# Patient Record
Sex: FEMALE | Race: WHITE | Employment: FULL TIME | URBAN - METROPOLITAN AREA
[De-identification: names, ages, dates, MRNs, and addresses within clinical notes are randomized per-mention and may not be internally consistent; named-entity substitution may affect disease eponyms.]

---

## 2017-12-12 ENCOUNTER — TELEPHONE (OUTPATIENT)
Dept: FAMILY MEDICINE CLINIC | Facility: CLINIC | Age: 22
End: 2017-12-12

## 2017-12-12 NOTE — TELEPHONE ENCOUNTER
Patient states that she has had multiple episodes of lightheadness. Currently feels slightly lightheaded.  Transferred to Triage

## 2017-12-12 NOTE — TELEPHONE ENCOUNTER
S/w pt. No she cannot come tomorrow d/t finals. She refused apt earlier when Omar offered to her. Pt will keep apt Thursday with jade.  Pt aware to immed care if sx's worsen prior to vs.

## 2017-12-12 NOTE — TELEPHONE ENCOUNTER
Had sudden light headedness last week while sitting down. Then had lightheadedness and vertigo, world is tilting not spinning, slight nausea. Feels like she is walking on water. When pt stretches she gets tingling in her feet, but can still feel them.

## 2017-12-14 ENCOUNTER — TELEPHONE (OUTPATIENT)
Dept: FAMILY MEDICINE CLINIC | Facility: CLINIC | Age: 22
End: 2017-12-14

## 2017-12-14 ENCOUNTER — OFFICE VISIT (OUTPATIENT)
Dept: FAMILY MEDICINE CLINIC | Facility: CLINIC | Age: 22
End: 2017-12-14

## 2017-12-14 VITALS
TEMPERATURE: 98 F | OXYGEN SATURATION: 98 % | DIASTOLIC BLOOD PRESSURE: 64 MMHG | WEIGHT: 157 LBS | SYSTOLIC BLOOD PRESSURE: 98 MMHG | BODY MASS INDEX: 29.64 KG/M2 | HEIGHT: 61 IN | HEART RATE: 58 BPM | RESPIRATION RATE: 18 BRPM

## 2017-12-14 DIAGNOSIS — F33.1 MODERATE EPISODE OF RECURRENT MAJOR DEPRESSIVE DISORDER (HCC): ICD-10-CM

## 2017-12-14 DIAGNOSIS — Z00.00 LABORATORY EXAMINATION ORDERED AS PART OF A COMPLETE PHYSICAL EXAMINATION: ICD-10-CM

## 2017-12-14 DIAGNOSIS — R42 DIZZINESS: Primary | ICD-10-CM

## 2017-12-14 DIAGNOSIS — N92.6 IRREGULAR MENSES: ICD-10-CM

## 2017-12-14 PROCEDURE — 99214 OFFICE O/P EST MOD 30 MIN: CPT | Performed by: NURSE PRACTITIONER

## 2017-12-14 RX ORDER — MECLIZINE HCL 12.5 MG/1
12.5 TABLET ORAL 3 TIMES DAILY PRN
Qty: 30 TABLET | Refills: 0 | Status: ON HOLD | OUTPATIENT
Start: 2017-12-14 | End: 2018-09-13

## 2017-12-15 NOTE — PROGRESS NOTES
Ester Carcamo is a 20year old female. HPI:   Patient presents today reporting 3 episodes of dizziness over the past week.  She reports that each time this has occurred she has been sitting at her computer and starts to feel as though her head is blowing u 0.05)*  04/24/13 : 127 lb (52 %, Z= 0.05)*    * Growth percentiles are based on [facility] 2-20 Years data. Current Outpatient Prescriptions:  Meclizine HCl 12.5 MG Oral Tab Take 1 tablet (12.5 mg total) by mouth 3 (three) times daily as needed.  Disp: 30 mass.   EYES: PERRLA, EOM intact, sclera clear without injection  NECK: supple,no adenopathy  LUNGS: clear to auscultation  CARDIO: RRR without murmur  EXTREMITIES: no cyanosis, clubbing or edema  Musculoskeletal: No gross deficit  Neurological: nerves II daily the 2nd week and then up to 100 mg the 3rd week and after. I discussed potential side effects of the medication. I discussed that it can take up to 6 weeks to see effects of the medication.   I discussed BHI with the patient and Yasmine Collado spoke

## 2017-12-15 NOTE — TELEPHONE ENCOUNTER
I called and spoke with the patient. Patient was leaving the office today to go to [facility] to have an assessment completed. The patient has not reported to [facility] as of 1815.  She reports that she had to call a Lyft because she did not drive to the office

## 2017-12-18 ENCOUNTER — TELEPHONE (OUTPATIENT)
Dept: FAMILY MEDICINE CLINIC | Facility: CLINIC | Age: 22
End: 2017-12-18

## 2017-12-18 ENCOUNTER — LAB ENCOUNTER (OUTPATIENT)
Dept: LAB | Age: 22
End: 2017-12-18
Attending: FAMILY MEDICINE
Payer: COMMERCIAL

## 2017-12-18 DIAGNOSIS — Z00.00 LABORATORY EXAMINATION ORDERED AS PART OF A COMPLETE PHYSICAL EXAMINATION: ICD-10-CM

## 2017-12-18 DIAGNOSIS — R42 DIZZINESS: ICD-10-CM

## 2017-12-18 DIAGNOSIS — R74.01 ELEVATED ALT MEASUREMENT: ICD-10-CM

## 2017-12-18 DIAGNOSIS — E55.9 VITAMIN D DEFICIENCY: Primary | ICD-10-CM

## 2017-12-18 PROCEDURE — 84439 ASSAY OF FREE THYROXINE: CPT | Performed by: NURSE PRACTITIONER

## 2017-12-18 PROCEDURE — 80050 GENERAL HEALTH PANEL: CPT | Performed by: NURSE PRACTITIONER

## 2017-12-18 PROCEDURE — 36415 COLL VENOUS BLD VENIPUNCTURE: CPT | Performed by: NURSE PRACTITIONER

## 2017-12-18 PROCEDURE — 82306 VITAMIN D 25 HYDROXY: CPT | Performed by: NURSE PRACTITIONER

## 2017-12-18 RX ORDER — ERGOCALCIFEROL 1.25 MG/1
50000 CAPSULE ORAL WEEKLY
Qty: 12 CAPSULE | Refills: 0 | Status: SHIPPED | OUTPATIENT
Start: 2017-12-18 | End: 2018-03-06

## 2017-12-18 NOTE — TELEPHONE ENCOUNTER
Outgoing call to patient, concerning follow up at [facility] two week Therapy session, which started today.   Patient states she decided after she left the clinic that that's not good for her now, looking to maybe restart art therapy which helpe

## 2017-12-18 NOTE — TELEPHONE ENCOUNTER
Patient seen in office 12/14/2017, by Cleopatra Britt. Outgoing call to patient for condition update, per [facility]. Left message on identified Voice mail to Call back.

## 2017-12-18 NOTE — TELEPHONE ENCOUNTER
SHAYLA     Pt called back. She has had no further episodes of dizziness since her visit here on 12/14/17. She did have a \" migraine like headache\" last night. That has resolved today. Her BP today was 104/50.  Pt was advised we will call her as soon as we ha

## 2018-02-06 ENCOUNTER — TELEPHONE (OUTPATIENT)
Dept: FAMILY MEDICINE CLINIC | Facility: CLINIC | Age: 23
End: 2018-02-06

## 2018-02-06 NOTE — TELEPHONE ENCOUNTER
S/w pt. Denies current vertigo. Reports she had this over a month ago and sx's have subsided. Past few days has felt like her L leg has been weaker. No pain. No redness or swelling.  Reports she had an episode yesterday where her L hand was numb, tingling

## 2018-02-06 NOTE — TELEPHONE ENCOUNTER
Patient states she has vertigo and is having number in her left hand. Also tinging in her left leg. Transferred to triage for evaluation.

## 2018-09-10 ENCOUNTER — APPOINTMENT (OUTPATIENT)
Dept: LAB | Age: 23
End: 2018-09-10
Attending: NURSE PRACTITIONER
Payer: COMMERCIAL

## 2018-09-10 DIAGNOSIS — R74.01 ELEVATED ALT MEASUREMENT: ICD-10-CM

## 2018-09-10 DIAGNOSIS — E55.9 VITAMIN D DEFICIENCY: ICD-10-CM

## 2018-09-10 LAB
ALBUMIN SERPL-MCNC: 4.1 G/DL (ref 3.5–4.8)
ALBUMIN/GLOB SERPL: 1.1 {RATIO} (ref 1–2)
ALP LIVER SERPL-CCNC: 68 U/L (ref 37–98)
ALT SERPL-CCNC: 35 U/L (ref 14–54)
ANION GAP SERPL CALC-SCNC: 10 MMOL/L (ref 0–18)
AST SERPL-CCNC: 17 U/L (ref 15–41)
BILIRUB SERPL-MCNC: 0.4 MG/DL (ref 0.1–2)
BUN BLD-MCNC: 13 MG/DL (ref 8–20)
BUN/CREAT SERPL: 15.9 (ref 10–20)
CALCIUM BLD-MCNC: 9.4 MG/DL (ref 8.3–10.3)
CHLORIDE SERPL-SCNC: 105 MMOL/L (ref 101–111)
CO2 SERPL-SCNC: 24 MMOL/L (ref 22–32)
CREAT BLD-MCNC: 0.82 MG/DL (ref 0.55–1.02)
GLOBULIN PLAS-MCNC: 3.6 G/DL (ref 2.5–4)
GLUCOSE BLD-MCNC: 80 MG/DL (ref 70–99)
M PROTEIN MFR SERPL ELPH: 7.7 G/DL (ref 6.1–8.3)
OSMOLALITY SERPL CALC.SUM OF ELEC: 287 MOSM/KG (ref 275–295)
POTASSIUM SERPL-SCNC: 4.3 MMOL/L (ref 3.6–5.1)
SODIUM SERPL-SCNC: 139 MMOL/L (ref 136–144)
VIT D+METAB SERPL-MCNC: 17.2 NG/ML (ref 30–100)

## 2018-09-10 PROCEDURE — 36415 COLL VENOUS BLD VENIPUNCTURE: CPT | Performed by: NURSE PRACTITIONER

## 2018-09-10 PROCEDURE — 80053 COMPREHEN METABOLIC PANEL: CPT | Performed by: NURSE PRACTITIONER

## 2018-09-10 PROCEDURE — 82306 VITAMIN D 25 HYDROXY: CPT | Performed by: NURSE PRACTITIONER

## 2018-09-10 NOTE — PROGRESS NOTES
Pita Phelps is a 24year old female. HPI:   Pt. Complains of vertigo. She saw Xiomy Maxwell in 1/18. She did the vertigo testing and it was positive. It happened 3-4 times since January. In the past 6 months, worsening mental health symptoms.   Stopped ser Alcohol/week: 0.0 oz      Comment: socially, negative - CAGE    Drug use: No       Results for orders placed or performed in visit on 40/64/16   COMP METABOLIC PANEL (14)   Result Value Ref Range    Glucose 75 70 - 99 mg/dL    BUN 10 8 - 20 mg/dL    Crea breath with exertion  CARDIOVASCULAR: denies chest pain on exertion  GI: denies abdominal pain,denies heartburn  : denies dysuria  MUSCULOSKELETAL: denies back pain  NEURO: denies headaches  PSYCHE: denies depression or anxiety  HEMATOLOGIC: denies hx of therapist currently. Labs ordered for LFT and Vitamin d. Spoke with [employer] today. Consider mood stabilizer if no organic cause. Will get xray from chiro. Reviewed PHQ-9 and [place].      The patient indicates understanding of these issues and agrees to

## 2018-09-12 DIAGNOSIS — E55.9 VITAMIN D DEFICIENCY: Primary | ICD-10-CM

## 2018-09-12 RX ORDER — ERGOCALCIFEROL 1.25 MG/1
50000 CAPSULE ORAL WEEKLY
Qty: 12 CAPSULE | Refills: 0 | Status: SHIPPED | OUTPATIENT
Start: 2018-09-12 | End: 2018-12-11

## 2018-09-13 PROBLEM — F32.9 MDD (MAJOR DEPRESSIVE DISORDER): Status: ACTIVE | Noted: 2018-09-13

## 2018-09-13 NOTE — BH LEVEL OF CARE ASSESSMENT
Level of Care Assessment Note    General Questions  Why are you here?: \"I had some xanax. I have been doing impulsive things. \"    Precipitating Events: She stated that she has been taking higher and higher doses.   She stated that she is stealing hydrocod Source  Referral Source: [facility]: [address] of information for CSSR: Patient;Collateral  In what setting is the screener performed?: in person  1.  Have you wished you were dead or wished you could go to sleep and no times in the past.  \"Every 8-9 months\" would feel intent to act on suicide.   Gilford Singh was vague about suicidal behavior in the past and due to the vagueness of the description of events it was difficult to ascertain what had happened however she expressed a she can against her skin and \"I did it so it's not visible. \"    Type of Injuries: Other (comment)  Describe Type of Injuries: see above   Triggers to Self Injury: emotional distress   Object(s) Used: Candida Slim object  Describe Object(s) Used: knife   Area(s) reported that she has had \"hypomanic\" sx in the past.  None appear present at this time.     Sleep Pattern: Disturbed/interrupted sleep  Number of Sleep Hours: (Erratic sleep pattern )  Use of Sleep Aids: Hydrocodone \"I started researching drug interacti (Unknown, vague )                             Support for Recovery  Is your living environment a supportive place for recovery?: Yes  Describe: Gilford Singh states that she has support from many people      Withdrawal Symptoms  History of Withdrawal Symptoms: Cristela Rasheed Speech: Rambling  Intensity of Volume: Soft  Clarity: Clear  Cognition  Concentration: Unimpaired  Memory: Recent memory intact; Remote memory intact  Orientation Level: Oriented X4  Insight: Poor  Fair/poor insight as evidenced by: taking action to follow stress; History of trauma; Access to lethal means; Pattern of impulsive decision making  Protective Factors: Parents     Motivational Stage of Change  Motivational Stage of Change: Contemplative    Level of Care Recommendations  Consulted with: Dr. Jc Storey

## 2018-09-13 NOTE — ED NOTES
PT arrives tearful but cooperative. Pt undressed into hospital gown and socks, all belongings bagged and labeled, including clothing, cell phone, pink duffle bag, 1 gold necklace. Pt placed on bedpan, warm blankets for comfort. No distress.

## 2018-09-13 NOTE — ED NOTES
Patient is resting comfortably. Pt awakens easily calm and cooperative stating I feel lethargic. Will cont. To monitor.  resps easy nonlabored

## 2018-09-13 NOTE — ED INITIAL ASSESSMENT (HPI)
Pt states she took 6-9 0.5mg tablets of Xanax 2 hours PTA, pt also intended to take more Xanax, hydrocodone and alcohol but did not. Pt hx self-injury to thighs and wrist 1 month ago.

## 2018-09-13 NOTE — ED PROVIDER NOTES
Patient was noted to be resting comfortably. She was cooperative. She was attended to by the nurses and psychiatric social worker. Decision was made to admit and ambulance crew is here to transfer.   She is in stable condition

## 2018-09-13 NOTE — ED PROVIDER NOTES
Patient Seen in: [facility] Emergency Department    History   Patient presents with:  Eval-P (psychiatric)    Stated Complaint: OD/ Eval P    HPI    The patient is a 23-year-old female with history of anxiety and depression, presenting to the emerge Pulse 56   Temp 98.1 °F (36.7 °C) (Temporal)   Resp 17   Ht 154.9 cm (5' 1\")   Wt 77.1 kg   LMP 09/13/2018   SpO2 95%   BMI 32.12 kg/m²         Physical Exam  General: Comfortable and well appearing. Alert and oriented in no distress.   Neuro: No focal ne Narrative:     Results of the Urine Drug Screen should be used only for medical purposes. ETHYL ALCOHOL - Normal   ACETAMINOPHEN (TYLENOL), S - Normal   SALICYLATE, SERUM - Normal   CBC WITH DIFFERENTIAL WITH PLATELET    Narrative:      The following orde

## 2018-09-14 PROBLEM — F31.81 SEVERE DEPRESSED BIPOLAR II DISORDER WITHOUT PSYCHOTIC FEATURES (HCC): Status: ACTIVE | Noted: 2018-09-14

## 2018-09-14 PROBLEM — F31.81 SEVERE DEPRESSED BIPOLAR II DISORDER WITHOUT PSYCHOTIC FEATURES (HCC): Status: RESOLVED | Noted: 2018-09-14 | Resolved: 2018-09-14

## 2018-09-14 PROBLEM — F32.9 MDD (MAJOR DEPRESSIVE DISORDER): Status: RESOLVED | Noted: 2018-09-13 | Resolved: 2018-09-14

## 2018-09-20 PROBLEM — F41.9 ANXIETY DISORDER, UNSPECIFIED: Status: ACTIVE | Noted: 2018-09-20

## 2018-10-02 ENCOUNTER — NURSE ONLY (OUTPATIENT)
Dept: FAMILY MEDICINE CLINIC | Facility: CLINIC | Age: 23
End: 2018-10-02
Payer: COMMERCIAL

## 2018-10-02 VITALS
BODY MASS INDEX: 31.91 KG/M2 | HEIGHT: 61 IN | WEIGHT: 169 LBS | RESPIRATION RATE: 16 BRPM | TEMPERATURE: 98 F | OXYGEN SATURATION: 98 % | HEART RATE: 65 BPM | SYSTOLIC BLOOD PRESSURE: 116 MMHG | DIASTOLIC BLOOD PRESSURE: 72 MMHG

## 2018-10-02 DIAGNOSIS — J02.9 ACUTE PHARYNGITIS, UNSPECIFIED ETIOLOGY: Primary | ICD-10-CM

## 2018-10-02 PROCEDURE — 87880 STREP A ASSAY W/OPTIC: CPT | Performed by: NURSE PRACTITIONER

## 2018-10-02 PROCEDURE — 99213 OFFICE O/P EST LOW 20 MIN: CPT | Performed by: NURSE PRACTITIONER

## 2018-10-02 PROCEDURE — 87081 CULTURE SCREEN ONLY: CPT | Performed by: NURSE PRACTITIONER

## 2018-10-02 NOTE — PATIENT INSTRUCTIONS
Rest and fluids  Ibuprofen as packet insert  Strep throat culture sent  Follow-up if not improving      Self-Care for Sore Throats    Sore throats happen for many reasons, such as colds, allergies, and infections caused by viruses or bacteria.  In any case, spreading. · Don’t strain your vocal cords.   Call your healthcare provider  Contact your healthcare provider if you have:  · A temperature over 101°F (38.3°C)  · White spots on the throat  · Great difficulty swallowing  · Trouble breathing  · A skin rash

## 2018-10-02 NOTE — PROGRESS NOTES
CHIEF COMPLAINT:   Patient presents with:  Sore Throat: swollen lymph nodes, body aches x 3 dys       HPI:   Selwyn Kate is a 24year old female presents to clinic with symptoms of sore throat. Patient has had for 3 days.  Symptoms have slightly increasin Smokeless tobacco: Never Used      Tobacco comment: quit recently; started vapin instead of smoking in august 2018    Alcohol use:  Yes      Alcohol/week: 0.0 oz      Comment: socially, negative - CAGE    Drug use: Yes      Types: Cannabis      Comment: Kit Lot # [id number] Numeric    Kit Expiration Date 2/29/2020 Date       ASSESSMENT AND PLAN:   Assessment: Acute pharyngitis, unspecified etiology  (primary encounter diagnosis)    Plan:  OTC Meds and instructions as listed below.   Patient requested send · Ease pain with anesthetic sprays. Aspirin or an aspirin substitute also helps.  Remember, never give aspirin to anyone 25 or younger, or if you are already taking blood thinners.   · For sore throats caused by allergies, try antihistamines to block the al

## 2018-10-19 ENCOUNTER — TELEPHONE (OUTPATIENT)
Dept: FAMILY MEDICINE CLINIC | Facility: CLINIC | Age: 23
End: 2018-10-19

## 2018-10-19 NOTE — TELEPHONE ENCOUNTER
DAVIDM advising pt she missed her appt this morning and that there will be a $40 no show fee applied to her account for no show.

## 2018-11-20 ENCOUNTER — APPOINTMENT (OUTPATIENT)
Dept: GENERAL RADIOLOGY | Age: 23
End: 2018-11-20
Attending: FAMILY MEDICINE
Payer: COMMERCIAL

## 2018-11-20 ENCOUNTER — HOSPITAL ENCOUNTER (OUTPATIENT)
Age: 23
Discharge: HOME | End: 2018-11-20
Attending: FAMILY MEDICINE
Payer: COMMERCIAL

## 2018-11-20 VITALS
BODY MASS INDEX: 30.58 KG/M2 | RESPIRATION RATE: 18 BRPM | TEMPERATURE: 98 F | DIASTOLIC BLOOD PRESSURE: 85 MMHG | HEIGHT: 61 IN | WEIGHT: 162 LBS | OXYGEN SATURATION: 98 % | SYSTOLIC BLOOD PRESSURE: 123 MMHG | HEART RATE: 86 BPM

## 2018-11-20 DIAGNOSIS — K30 INDIGESTION: Primary | ICD-10-CM

## 2018-11-20 PROCEDURE — 80047 BASIC METABLC PNL IONIZED CA: CPT

## 2018-11-20 PROCEDURE — 81002 URINALYSIS NONAUTO W/O SCOPE: CPT

## 2018-11-20 PROCEDURE — 74019 RADEX ABDOMEN 2 VIEWS: CPT | Performed by: FAMILY MEDICINE

## 2018-11-20 PROCEDURE — 81002 URINALYSIS NONAUTO W/O SCOPE: CPT | Performed by: FAMILY MEDICINE

## 2018-11-20 PROCEDURE — 99204 OFFICE O/P NEW MOD 45 MIN: CPT

## 2018-11-20 PROCEDURE — 36415 COLL VENOUS BLD VENIPUNCTURE: CPT

## 2018-11-20 PROCEDURE — 99214 OFFICE O/P EST MOD 30 MIN: CPT

## 2018-11-20 PROCEDURE — 83690 ASSAY OF LIPASE: CPT | Performed by: FAMILY MEDICINE

## 2018-11-20 PROCEDURE — 81025 URINE PREGNANCY TEST: CPT | Performed by: FAMILY MEDICINE

## 2018-11-20 PROCEDURE — 80076 HEPATIC FUNCTION PANEL: CPT | Performed by: FAMILY MEDICINE

## 2018-11-20 PROCEDURE — 85025 COMPLETE CBC W/AUTO DIFF WBC: CPT | Performed by: FAMILY MEDICINE

## 2018-11-20 PROCEDURE — 81025 URINE PREGNANCY TEST: CPT

## 2018-11-20 RX ORDER — DICYCLOMINE HCL 20 MG
20 TABLET ORAL 4 TIMES DAILY PRN
Qty: 30 TABLET | Refills: 0 | Status: SHIPPED | OUTPATIENT
Start: 2018-11-20 | End: 2018-12-20

## 2018-11-20 NOTE — ED PROVIDER NOTES
Patient Seen in: [facility]    History   Patient presents with:  Abdominal Pain    Stated Complaint: abdominal pain x3day    HPI    69-year-old female with history of anxiety, depression, and chronic back pain presents to t noted in HPI. Constitutional and vital signs reviewed. All other systems reviewed and negative except as noted above.     Physical Exam     ED Triage Vitals [11/20/18 1508]   /85   Pulse 86   Resp 18   Temp 97.9 °F (36.6 °C)   Temp src Temporal would explain her abdominal pain. CBC will be sent secondary to her platelets. Hepatic panel and lipase were sent to the main hospital.  Patient does take ibuprofen on a regular basis. Her abdominal x-ray showed some moderate stool.   Patient was reassur

## 2018-11-20 NOTE — ED INITIAL ASSESSMENT (HPI)
Pt c/o upper left to mid abd pain for the past 3 days. Pt c/o nausea but denies any diarrhea or vomiting. Pt sates that she still has been eating. Pt denies any urinary symptoms. Pt states that the pain is intermittent.  Pt states that he pain is worse at

## 2018-12-03 ENCOUNTER — OFFICE VISIT (OUTPATIENT)
Dept: FAMILY MEDICINE CLINIC | Facility: CLINIC | Age: 23
End: 2018-12-03
Payer: COMMERCIAL

## 2018-12-03 VITALS
WEIGHT: 165 LBS | SYSTOLIC BLOOD PRESSURE: 120 MMHG | TEMPERATURE: 98 F | BODY MASS INDEX: 30.75 KG/M2 | DIASTOLIC BLOOD PRESSURE: 80 MMHG | HEART RATE: 76 BPM | RESPIRATION RATE: 16 BRPM | HEIGHT: 61.5 IN

## 2018-12-03 DIAGNOSIS — Z30.09 FAMILY PLANNING COUNSELING: Primary | ICD-10-CM

## 2018-12-03 DIAGNOSIS — F31.81 SEVERE DEPRESSED BIPOLAR II DISORDER WITHOUT PSYCHOTIC FEATURES (HCC): ICD-10-CM

## 2018-12-03 DIAGNOSIS — N92.6 IRREGULAR MENSES: ICD-10-CM

## 2018-12-03 DIAGNOSIS — Z23 NEED FOR VACCINATION: ICD-10-CM

## 2018-12-03 PROCEDURE — 90471 IMMUNIZATION ADMIN: CPT | Performed by: NURSE PRACTITIONER

## 2018-12-03 PROCEDURE — 99214 OFFICE O/P EST MOD 30 MIN: CPT | Performed by: NURSE PRACTITIONER

## 2018-12-03 PROCEDURE — 81025 URINE PREGNANCY TEST: CPT | Performed by: NURSE PRACTITIONER

## 2018-12-03 PROCEDURE — 90686 IIV4 VACC NO PRSV 0.5 ML IM: CPT | Performed by: NURSE PRACTITIONER

## 2018-12-03 NOTE — PROGRESS NOTES
Delisa Lanza is a 24year old female. HPI:   Patient presents today to discuss contraceptive options. Patient reports that she would like long term contraception.  She reports that she has been on the Nuvaring and pill in the past. She denies any family o upper back pain - undiagnosed   • Depression     therapist, no meds   • Headache disorder     hx of headaches - blinding   • Overweight    • Smoker       Social History:  Social History    Tobacco Use      Smoking status: Former Smoker        Packs/day: 0. PRESERVATIVE FREE 0.5 ML      Meds & Refills for this Visit:  Requested Prescriptions      No prescriptions requested or ordered in this encounter       Imaging & Consults:  [facility] - INTERNAL  OP REFERRAL TO PSYCHIATRY  FLULAVAL INFLUENZA VACCINE QUAD PRESERV

## 2019-03-11 ENCOUNTER — TELEPHONE (OUTPATIENT)
Dept: OBGYN CLINIC | Facility: CLINIC | Age: 24
End: 2019-03-11

## 2019-04-08 ENCOUNTER — OFFICE VISIT (OUTPATIENT)
Dept: OBGYN CLINIC | Facility: CLINIC | Age: 24
End: 2019-04-08
Payer: COMMERCIAL

## 2019-04-08 VITALS
HEART RATE: 73 BPM | WEIGHT: 165 LBS | HEIGHT: 61.25 IN | SYSTOLIC BLOOD PRESSURE: 110 MMHG | BODY MASS INDEX: 30.75 KG/M2 | DIASTOLIC BLOOD PRESSURE: 62 MMHG

## 2019-04-08 DIAGNOSIS — Z11.3 SCREEN FOR STD (SEXUALLY TRANSMITTED DISEASE): ICD-10-CM

## 2019-04-08 DIAGNOSIS — Z30.09 BIRTH CONTROL COUNSELING: ICD-10-CM

## 2019-04-08 DIAGNOSIS — Z01.419 WELL WOMAN EXAM WITH ROUTINE GYNECOLOGICAL EXAM: Primary | ICD-10-CM

## 2019-04-08 DIAGNOSIS — N92.6 IRREGULAR MENSES: ICD-10-CM

## 2019-04-08 DIAGNOSIS — Z12.4 CERVICAL CANCER SCREENING: ICD-10-CM

## 2019-04-08 PROCEDURE — 87491 CHLMYD TRACH DNA AMP PROBE: CPT | Performed by: NURSE PRACTITIONER

## 2019-04-08 PROCEDURE — 87591 N.GONORRHOEAE DNA AMP PROB: CPT | Performed by: NURSE PRACTITIONER

## 2019-04-08 PROCEDURE — 99385 PREV VISIT NEW AGE 18-39: CPT | Performed by: NURSE PRACTITIONER

## 2019-04-08 PROCEDURE — 88175 CYTOPATH C/V AUTO FLUID REDO: CPT | Performed by: NURSE PRACTITIONER

## 2019-04-08 PROCEDURE — 81025 URINE PREGNANCY TEST: CPT | Performed by: NURSE PRACTITIONER

## 2019-04-08 RX ORDER — ACETAMINOPHEN AND CODEINE PHOSPHATE 120; 12 MG/5ML; MG/5ML
0.35 SOLUTION ORAL DAILY
Qty: 3 PACKAGE | Refills: 0 | Status: SHIPPED | OUTPATIENT
Start: 2019-04-08 | End: 2019-05-06

## 2019-04-08 NOTE — PROGRESS NOTES
Here for new gynecology visit. 24year old G 0 P 0. Patient's last menstrual period was 03/10/2019 (within days). Marla Siddiqui Here for Annual Gynecologic Exam. She is interested in long acting contraception. Menses Q 30-75 days for 5 days.   Condoms typically arthralgias, joint swelling. Neurological:  No headaches, depression, anxiety, migraines, seizure disorders, behavioral problems.                /62   Pulse 73   Ht 61.25\"   Wt 165 lb   LMP 03/10/2019 (Within Days)   BMI 30.92 kg/m²     NECK:  Thyro

## 2019-09-04 ENCOUNTER — HOSPITAL ENCOUNTER (OUTPATIENT)
Age: 24
Discharge: HOME | End: 2019-09-04
Attending: EMERGENCY MEDICINE
Payer: COMMERCIAL

## 2019-09-04 ENCOUNTER — APPOINTMENT (OUTPATIENT)
Dept: GENERAL RADIOLOGY | Age: 24
End: 2019-09-04
Attending: EMERGENCY MEDICINE
Payer: COMMERCIAL

## 2019-09-04 VITALS
WEIGHT: 149 LBS | SYSTOLIC BLOOD PRESSURE: 141 MMHG | OXYGEN SATURATION: 99 % | DIASTOLIC BLOOD PRESSURE: 98 MMHG | BODY MASS INDEX: 28.13 KG/M2 | HEIGHT: 61 IN | HEART RATE: 56 BPM | RESPIRATION RATE: 16 BRPM | TEMPERATURE: 98 F

## 2019-09-04 DIAGNOSIS — M77.50 TENDINITIS OF ANKLE: Primary | ICD-10-CM

## 2019-09-04 PROCEDURE — 99213 OFFICE O/P EST LOW 20 MIN: CPT

## 2019-09-04 PROCEDURE — 73610 X-RAY EXAM OF ANKLE: CPT | Performed by: EMERGENCY MEDICINE

## 2019-09-04 NOTE — ED PROVIDER NOTES
Patient Seen in: [facility]    History   Patient presents with:  Lower Extremity Injury (musculoskeletal)    Stated Complaint: Rt Foot Pain x 4 days     HPI    43-year-old female complaint of right ankle pain the patient st systems reviewed and negative except as noted above.     Physical Exam     ED Triage Vitals [09/04/19 1813]   BP (!) 141/98   Pulse 56   Resp 16   Temp 98.3 °F (36.8 °C)   Temp src Temporal   SpO2 99 %   O2 Device None (Room air)       Current:BP (!) 141/98

## 2019-10-02 ENCOUNTER — APPOINTMENT (OUTPATIENT)
Dept: GENERAL RADIOLOGY | Age: 24
End: 2019-10-02
Attending: EMERGENCY MEDICINE
Payer: COMMERCIAL

## 2019-10-02 ENCOUNTER — HOSPITAL ENCOUNTER (OUTPATIENT)
Age: 24
Discharge: HOME | End: 2019-10-02
Attending: EMERGENCY MEDICINE
Payer: COMMERCIAL

## 2019-10-02 VITALS
TEMPERATURE: 99 F | WEIGHT: 147 LBS | DIASTOLIC BLOOD PRESSURE: 88 MMHG | OXYGEN SATURATION: 99 % | BODY MASS INDEX: 27.75 KG/M2 | HEART RATE: 74 BPM | HEIGHT: 61 IN | RESPIRATION RATE: 18 BRPM | SYSTOLIC BLOOD PRESSURE: 126 MMHG

## 2019-10-02 DIAGNOSIS — J40 BRONCHITIS: Primary | ICD-10-CM

## 2019-10-02 PROCEDURE — 99214 OFFICE O/P EST MOD 30 MIN: CPT

## 2019-10-02 PROCEDURE — 71046 X-RAY EXAM CHEST 2 VIEWS: CPT | Performed by: EMERGENCY MEDICINE

## 2019-10-02 PROCEDURE — 99213 OFFICE O/P EST LOW 20 MIN: CPT

## 2019-10-02 NOTE — ED INITIAL ASSESSMENT (HPI)
Pt c/o wheezing when exercising. Pt denies chest pain. Pt states she just stopped vaping about 1 month ago. Pt also c/o shortness of breath sometimes when walking.

## 2019-10-02 NOTE — ED PROVIDER NOTES
Patient Seen in: [facility]      History   Patient presents with:  Dyspnea KALLIE SOB (respiratory)    Stated Complaint: weezing      HPI    40-year-old female complaining of wheezing.   The patient states she has had some inte and oriented ×3 in no acute distress. HEENT exam within normal limits. Neck supple without lymphadenopathy or JVD. Lungs are clear to auscultation. Cardiovascular exam regular rate and rhythm without murmurs.   Abdomen is soft and nontender without rebo

## 2019-12-30 ENCOUNTER — OFFICE VISIT (OUTPATIENT)
Dept: FAMILY MEDICINE CLINIC | Facility: CLINIC | Age: 24
End: 2019-12-30
Payer: COMMERCIAL

## 2019-12-30 VITALS
BODY MASS INDEX: 27.75 KG/M2 | SYSTOLIC BLOOD PRESSURE: 118 MMHG | WEIGHT: 147 LBS | HEIGHT: 61 IN | DIASTOLIC BLOOD PRESSURE: 76 MMHG | HEART RATE: 79 BPM | OXYGEN SATURATION: 98 % | RESPIRATION RATE: 16 BRPM | TEMPERATURE: 99 F

## 2019-12-30 DIAGNOSIS — J03.90 EXUDATIVE TONSILLITIS: ICD-10-CM

## 2019-12-30 DIAGNOSIS — J02.9 SORE THROAT: Primary | ICD-10-CM

## 2019-12-30 PROCEDURE — 87880 STREP A ASSAY W/OPTIC: CPT | Performed by: NURSE PRACTITIONER

## 2019-12-30 PROCEDURE — 99213 OFFICE O/P EST LOW 20 MIN: CPT | Performed by: NURSE PRACTITIONER

## 2019-12-30 PROCEDURE — 87081 CULTURE SCREEN ONLY: CPT | Performed by: NURSE PRACTITIONER

## 2019-12-30 RX ORDER — AMOXICILLIN 875 MG/1
875 TABLET, COATED ORAL 2 TIMES DAILY
Qty: 20 TABLET | Refills: 0 | Status: SHIPPED | OUTPATIENT
Start: 2019-12-30 | End: 2020-01-09

## 2019-12-30 NOTE — PROGRESS NOTES
CHIEF COMPLAINT:   Patient presents with:  Sore Throat: 2-3 days. HPI:   Sonia Heller is a 21year old female presents to clinic with symptoms of severe sore throat. Patient has had for 2-3 days. Symptoms have worsening since onset.   Patient reports GI: denies abdominal pain, constipation and diarrhea  NEURO: denies dizziness or lightheadedness    EXAM:   /76   Pulse 79   Temp 98.9 °F (37.2 °C) (Oral)   Resp 16   Ht 61\"   Wt 147 lb (66.7 kg)   LMP 12/20/2019   SpO2 98%   BMI 27.78 kg/m²   GENER The patient/parent indicates understanding of these issues and agrees to the plan. Patient Instructions     Self-Care for Sore Throats    Sore throats happen for many reasons, such as colds, allergies, and infections caused by viruses or bacteria.  In an · When you’re around someone with a sore throat or cold, wash your hands often to keep viruses or bacteria from spreading. · Don’t strain your vocal cords.   Contact your healthcare provider if you have:  · A temperature over 101°F (38.3°C)  · White spots

## 2021-06-07 ENCOUNTER — TELEPHONE (OUTPATIENT)
Dept: FAMILY MEDICINE CLINIC | Facility: CLINIC | Age: 26
End: 2021-06-07

## 2021-06-07 PROCEDURE — 36415 COLL VENOUS BLD VENIPUNCTURE: CPT

## 2021-06-07 PROCEDURE — 99285 EMERGENCY DEPT VISIT HI MDM: CPT

## 2021-06-07 PROCEDURE — 99284 EMERGENCY DEPT VISIT MOD MDM: CPT

## 2021-06-08 ENCOUNTER — APPOINTMENT (OUTPATIENT)
Dept: ULTRASOUND IMAGING | Facility: HOSPITAL | Age: 26
End: 2021-06-08
Attending: EMERGENCY MEDICINE
Payer: COMMERCIAL

## 2021-06-08 ENCOUNTER — HOSPITAL ENCOUNTER (EMERGENCY)
Facility: HOSPITAL | Age: 26
Discharge: HOME | End: 2021-06-08
Attending: EMERGENCY MEDICINE
Payer: COMMERCIAL

## 2021-06-08 VITALS
HEIGHT: 61 IN | HEART RATE: 68 BPM | TEMPERATURE: 97 F | DIASTOLIC BLOOD PRESSURE: 62 MMHG | SYSTOLIC BLOOD PRESSURE: 121 MMHG | WEIGHT: 165 LBS | BODY MASS INDEX: 31.15 KG/M2 | OXYGEN SATURATION: 99 % | RESPIRATION RATE: 18 BRPM

## 2021-06-08 DIAGNOSIS — N93.8 DYSFUNCTIONAL UTERINE BLEEDING: Primary | ICD-10-CM

## 2021-06-08 PROCEDURE — 76830 TRANSVAGINAL US NON-OB: CPT | Performed by: EMERGENCY MEDICINE

## 2021-06-08 PROCEDURE — 93975 VASCULAR STUDY: CPT | Performed by: EMERGENCY MEDICINE

## 2021-06-08 PROCEDURE — 81001 URINALYSIS AUTO W/SCOPE: CPT | Performed by: EMERGENCY MEDICINE

## 2021-06-08 PROCEDURE — 80053 COMPREHEN METABOLIC PANEL: CPT | Performed by: EMERGENCY MEDICINE

## 2021-06-08 PROCEDURE — 81025 URINE PREGNANCY TEST: CPT

## 2021-06-08 PROCEDURE — 76856 US EXAM PELVIC COMPLETE: CPT | Performed by: EMERGENCY MEDICINE

## 2021-06-08 PROCEDURE — 85025 COMPLETE CBC W/AUTO DIFF WBC: CPT | Performed by: EMERGENCY MEDICINE

## 2021-06-08 NOTE — TELEPHONE ENCOUNTER
I was paged by the patient. Patient says that yesterday she had sex. Started to have a bleeding after that. She has some pain with intercourse. The bleeding was light but now started to have a clots. The blood is fresh.   She been bleeding the whole da

## 2021-06-08 NOTE — ED PROVIDER NOTES
Patient Seen in: [facility] Emergency Department      History   Patient presents with:  Eval-G    Stated Complaint: post coital bleeding, denies pregnancy    HPI/Subjective:   HPI    18-year-old female presents today for vaginal bleeding.   Patient l Review of Systems    Positive for stated complaint: post coital bleeding, denies pregnancy  Other systems are as noted in HPI. Constitutional and vital signs reviewed. All other systems reviewed and negative except as noted above.     Physical E 1.11 (*)     All other components within normal limits   URINALYSIS WITH CULTURE REFLEX - Abnormal; Notable for the following components:    Clarity Urine Hazy (*)     Blood Urine Large (*)     RBC Urine 3-5 (*)     Bacteria Urine Rare (*)     Squamous Epi further care and assessment of her presenting symptoms. Patient feels comfortable with discharge plan. I counseled her on return precautions, she will return for any worsening symptoms.                        Disposition and Plan     Clinical Impression:

## 2021-06-08 NOTE — ED INITIAL ASSESSMENT (HPI)
Pt presents to ed ambulatory with steady gait c/o vaginal bleeding that started yesterday, no states dime sized clots. Also c/o left lower quadrant abdominal pressure at a 1/10, states hx of ovarian cysts.

## 2021-06-24 ENCOUNTER — LAB ENCOUNTER (OUTPATIENT)
Dept: LAB | Age: 26
End: 2021-06-24
Attending: FAMILY MEDICINE
Payer: COMMERCIAL

## 2021-06-24 ENCOUNTER — OFFICE VISIT (OUTPATIENT)
Dept: FAMILY MEDICINE CLINIC | Facility: CLINIC | Age: 26
End: 2021-06-24
Payer: COMMERCIAL

## 2021-06-24 VITALS
TEMPERATURE: 97 F | HEART RATE: 88 BPM | DIASTOLIC BLOOD PRESSURE: 78 MMHG | HEIGHT: 62.5 IN | BODY MASS INDEX: 32.47 KG/M2 | SYSTOLIC BLOOD PRESSURE: 122 MMHG | RESPIRATION RATE: 16 BRPM | WEIGHT: 181 LBS

## 2021-06-24 DIAGNOSIS — N93.8 DUB (DYSFUNCTIONAL UTERINE BLEEDING): Primary | ICD-10-CM

## 2021-06-24 DIAGNOSIS — N94.6 DYSMENORRHEA: ICD-10-CM

## 2021-06-24 DIAGNOSIS — Z09 HOSPITAL DISCHARGE FOLLOW-UP: ICD-10-CM

## 2021-06-24 DIAGNOSIS — Z13.89 SCREENING FOR GENITOURINARY CONDITION: ICD-10-CM

## 2021-06-24 DIAGNOSIS — Z00.00 LABORATORY EXAMINATION ORDERED AS PART OF A ROUTINE GENERAL MEDICAL EXAMINATION: ICD-10-CM

## 2021-06-24 DIAGNOSIS — Z80.3 FAMILY HISTORY OF BREAST CANCER: ICD-10-CM

## 2021-06-24 PROCEDURE — 83001 ASSAY OF GONADOTROPIN (FSH): CPT | Performed by: FAMILY MEDICINE

## 2021-06-24 PROCEDURE — 3078F DIAST BP <80 MM HG: CPT | Performed by: FAMILY MEDICINE

## 2021-06-24 PROCEDURE — 83002 ASSAY OF GONADOTROPIN (LH): CPT | Performed by: FAMILY MEDICINE

## 2021-06-24 PROCEDURE — 80061 LIPID PANEL: CPT | Performed by: FAMILY MEDICINE

## 2021-06-24 PROCEDURE — 82670 ASSAY OF TOTAL ESTRADIOL: CPT | Performed by: FAMILY MEDICINE

## 2021-06-24 PROCEDURE — 3008F BODY MASS INDEX DOCD: CPT | Performed by: FAMILY MEDICINE

## 2021-06-24 PROCEDURE — 99215 OFFICE O/P EST HI 40 MIN: CPT | Performed by: FAMILY MEDICINE

## 2021-06-24 PROCEDURE — 84439 ASSAY OF FREE THYROXINE: CPT | Performed by: FAMILY MEDICINE

## 2021-06-24 PROCEDURE — 80050 GENERAL HEALTH PANEL: CPT | Performed by: FAMILY MEDICINE

## 2021-06-24 PROCEDURE — 3074F SYST BP LT 130 MM HG: CPT | Performed by: FAMILY MEDICINE

## 2021-06-24 PROCEDURE — 82306 VITAMIN D 25 HYDROXY: CPT | Performed by: FAMILY MEDICINE

## 2021-06-24 NOTE — PROGRESS NOTES
Pita Phelps is a 31year old female. HPI:   Pt. States that she had irregular periods. Can range 1-4 months. When grandpa  -- sis did not have a period for 6 months. Last Sept had cyst and resolved. Most irregular period was this month.   May had Alcohol use:  Yes      Alcohol/week: 0.0 standard drinks      Comment: 2 drinks//month    Drug use: Not Currently      Types: Cannabis      Comment: once/month       Results for orders placed or performed during the hospital encounter of 06/08/21   Comp Met 4.0 - 11.0 x10(3) uL    RBC 4.64 3.80 - 5.30 x10(6)uL    HGB 13.8 12.0 - 16.0 g/dL    HCT 41.1 35.0 - 48.0 %    .0 150.0 - 450.0 10(3)uL    MCV 88.6 80.0 - 100.0 fL    MCH 29.7 26.0 - 34.0 pg    MCHC 33.6 31.0 - 37.0 g/dL    RDW 12.8 11.0 - 15.0 % Comp Metabolic Panel (14)      TSH W Reflex To Free T4      Lipid Panel      Vitamin D, 25-Hydroxy      Urinalysis with Culture Reflex      FSH AND LH [7662] [Q]      Estradiol [E]      Meds & Refills for this Visit:  Requested Prescriptions      No prescr

## 2021-08-20 NOTE — ED INITIAL ASSESSMENT (HPI)
C/o right lower leg and foot pain/swelling since Saturday, after a long walk. History of ligament tear in the right ankle. Took IBU this morning at 0845. 5

## 2021-12-20 ENCOUNTER — HOSPITAL ENCOUNTER (OUTPATIENT)
Age: 26
Discharge: HOME | End: 2021-12-20
Payer: COMMERCIAL

## 2021-12-20 VITALS
HEART RATE: 75 BPM | HEIGHT: 61 IN | SYSTOLIC BLOOD PRESSURE: 128 MMHG | WEIGHT: 175 LBS | DIASTOLIC BLOOD PRESSURE: 49 MMHG | RESPIRATION RATE: 16 BRPM | BODY MASS INDEX: 33.04 KG/M2 | OXYGEN SATURATION: 97 % | TEMPERATURE: 99 F

## 2021-12-20 DIAGNOSIS — J01.90 ACUTE NON-RECURRENT SINUSITIS, UNSPECIFIED LOCATION: Primary | ICD-10-CM

## 2021-12-20 DIAGNOSIS — Z20.822 COVID-19 RULED OUT: ICD-10-CM

## 2021-12-20 PROCEDURE — 99213 OFFICE O/P EST LOW 20 MIN: CPT | Performed by: PHYSICIAN ASSISTANT

## 2021-12-20 PROCEDURE — U0002 COVID-19 LAB TEST NON-CDC: HCPCS | Performed by: PHYSICIAN ASSISTANT

## 2021-12-20 RX ORDER — AMOXICILLIN AND CLAVULANATE POTASSIUM 875; 125 MG/1; MG/1
1 TABLET, FILM COATED ORAL 2 TIMES DAILY
Qty: 14 TABLET | Refills: 0 | Status: SHIPPED | OUTPATIENT
Start: 2021-12-20 | End: 2021-12-27

## 2021-12-20 NOTE — ED PROVIDER NOTES
Patient Seen in: [facility] [address]      History   Patient presents with:  Cough    Stated Complaint: congestion , post nasel drip, ,x10 days     Subjective:   HPI    25-year-old female presents to the IC for Covid testing.   Patient reports Resp 16   Ht 154.9 cm (5' 1\")   Wt 79.4 kg   LMP 12/06/2021   SpO2 97%   BMI 33.07 kg/m²         Physical Exam  Vitals and nursing note reviewed. Constitutional:       Appearance: She is well-developed. HENT:      Head: Atraumatic.       Right Ear: Ext Prescribed:  Current Discharge Medication List    START taking these medications    amoxicillin clavulanate 875-125 MG Oral Tab  Take 1 tablet by mouth 2 (two) times daily for 7 days.   Qty: 14 tablet Refills: 0

## 2022-04-26 ENCOUNTER — TELEPHONE (OUTPATIENT)
Dept: FAMILY MEDICINE CLINIC | Facility: CLINIC | Age: 27
End: 2022-04-26

## 2022-04-26 ENCOUNTER — TELEMEDICINE (OUTPATIENT)
Dept: FAMILY MEDICINE CLINIC | Facility: CLINIC | Age: 27
End: 2022-04-26
Payer: COMMERCIAL

## 2022-04-26 DIAGNOSIS — U07.1 COVID: Primary | ICD-10-CM

## 2022-04-26 PROBLEM — N92.6 IRREGULAR MENSES: Status: ACTIVE | Noted: 2021-07-07

## 2022-04-26 PROCEDURE — 99213 OFFICE O/P EST LOW 20 MIN: CPT | Performed by: FAMILY MEDICINE

## 2022-04-26 RX ORDER — MELATONIN
1000 DAILY
COMMUNITY

## 2022-04-26 RX ORDER — MULTIVITAMIN
1 TABLET ORAL DAILY
COMMUNITY

## 2022-04-26 NOTE — TELEPHONE ENCOUNTER
Pt first developed symptoms and  tested positive for Covid 4/16. Pt has no symptoms at this time. Pt is traveling on the 28th but is still testing positive for Covid. Pt is requesting a letter stating she is recovered from Covid and is safe to travel. Please advise and thank you!

## 2022-04-26 NOTE — TELEPHONE ENCOUNTER
LOV: 6/24/2021 for dysfunctional uterine bleeding/ ED follow-up --> pt was asked to RTC in 3 mths (about 9/24/21) for a CPE-->had not scheduled.     Per Dr. Ny Poulsbo Virtual Visit today at end of day (4 pm)-->LMTCB

## 2022-04-26 NOTE — TELEPHONE ENCOUNTER
I called patient back-->I scheduled her virtual visit for 4 pm appt and she will get on now, if she is unable to Dr. Velma Raymundo will let me know so I can adjust time. Dr. Velma Raymundo aware.

## 2022-04-26 NOTE — TELEPHONE ENCOUNTER
Haley Abernathy is returning a call to the office will do virtual at 4 today with Dr Margareth Broderick

## 2022-10-02 ENCOUNTER — HOSPITAL ENCOUNTER (OUTPATIENT)
Age: 27
Discharge: HOME | End: 2022-10-02
Payer: COMMERCIAL

## 2022-10-02 VITALS
DIASTOLIC BLOOD PRESSURE: 71 MMHG | HEIGHT: 61 IN | BODY MASS INDEX: 30.21 KG/M2 | TEMPERATURE: 102 F | OXYGEN SATURATION: 98 % | RESPIRATION RATE: 16 BRPM | WEIGHT: 160 LBS | HEART RATE: 100 BPM | SYSTOLIC BLOOD PRESSURE: 122 MMHG

## 2022-10-02 DIAGNOSIS — J03.90 ACUTE TONSILLITIS, UNSPECIFIED ETIOLOGY: Primary | ICD-10-CM

## 2022-10-02 DIAGNOSIS — R50.81 FEVER IN OTHER DISEASES: ICD-10-CM

## 2022-10-02 DIAGNOSIS — B34.9 VIRAL SYNDROME: ICD-10-CM

## 2022-10-02 LAB
POCT MONO: NEGATIVE
S PYO AG THROAT QL: NEGATIVE
SARS-COV-2 RNA RESP QL NAA+PROBE: NOT DETECTED

## 2022-10-02 PROCEDURE — 99213 OFFICE O/P EST LOW 20 MIN: CPT | Performed by: PHYSICIAN ASSISTANT

## 2022-10-02 PROCEDURE — U0002 COVID-19 LAB TEST NON-CDC: HCPCS | Performed by: PHYSICIAN ASSISTANT

## 2022-10-02 PROCEDURE — 86308 HETEROPHILE ANTIBODY SCREEN: CPT | Performed by: PHYSICIAN ASSISTANT

## 2022-10-02 PROCEDURE — 87880 STREP A ASSAY W/OPTIC: CPT | Performed by: PHYSICIAN ASSISTANT

## 2022-10-02 RX ORDER — IBUPROFEN 600 MG/1
600 TABLET ORAL ONCE
Status: COMPLETED | OUTPATIENT
Start: 2022-10-02 | End: 2022-10-02

## 2022-10-02 RX ORDER — DEXAMETHASONE 4 MG/1
12 TABLET ORAL ONCE
Status: COMPLETED | OUTPATIENT
Start: 2022-10-02 | End: 2022-10-02

## 2022-10-04 ENCOUNTER — PATIENT MESSAGE (OUTPATIENT)
Dept: FAMILY MEDICINE CLINIC | Facility: CLINIC | Age: 27
End: 2022-10-04

## 2022-10-07 ENCOUNTER — TELEMEDICINE (OUTPATIENT)
Dept: FAMILY MEDICINE CLINIC | Facility: CLINIC | Age: 27
End: 2022-10-07

## 2022-10-07 DIAGNOSIS — J02.0 STREP PHARYNGITIS: Primary | ICD-10-CM

## 2022-10-07 PROCEDURE — 99213 OFFICE O/P EST LOW 20 MIN: CPT | Performed by: FAMILY MEDICINE

## 2022-10-07 RX ORDER — AMOXICILLIN AND CLAVULANATE POTASSIUM 875; 125 MG/1; MG/1
1 TABLET, FILM COATED ORAL 2 TIMES DAILY
Qty: 20 TABLET | Refills: 0 | Status: SHIPPED | OUTPATIENT
Start: 2022-10-07 | End: 2022-10-17

## 2022-10-07 NOTE — TELEPHONE ENCOUNTER
I called patient and offered Virtual Visit now. She accepted and virtual visit scheduled. Patient verbalized understanding. No further questions or concerns at this time.     Dr. Leonora Barragan is aware

## 2022-10-07 NOTE — TELEPHONE ENCOUNTER
From: Madelin Garcia  To: Shahram Loredo DO  Sent: 10/4/2022 11:49 AM CDT  Subject: Question regarding GRP A Anthony Jernigan, looks like I tested positive for non group A strep. What are the treatment recommendations?

## 2022-10-07 NOTE — TELEPHONE ENCOUNTER
Patient positive for beta-hemolytic strep. Symptoms are worsening. I believe she needs to be treated. Can she do a video now?

## 2022-10-12 ENCOUNTER — OFFICE VISIT (OUTPATIENT)
Dept: OBGYN CLINIC | Facility: CLINIC | Age: 27
End: 2022-10-12
Payer: COMMERCIAL

## 2022-10-12 VITALS
DIASTOLIC BLOOD PRESSURE: 76 MMHG | WEIGHT: 166 LBS | BODY MASS INDEX: 29.79 KG/M2 | HEART RATE: 87 BPM | HEIGHT: 62.5 IN | SYSTOLIC BLOOD PRESSURE: 118 MMHG

## 2022-10-12 DIAGNOSIS — N92.6 IRREGULAR MENSES: ICD-10-CM

## 2022-10-12 DIAGNOSIS — Z12.4 CERVICAL CANCER SCREENING: ICD-10-CM

## 2022-10-12 DIAGNOSIS — Z01.419 ENCOUNTER FOR WELL WOMAN EXAM WITH ROUTINE GYNECOLOGICAL EXAM: Primary | ICD-10-CM

## 2022-10-12 PROCEDURE — 99385 PREV VISIT NEW AGE 18-39: CPT | Performed by: OBSTETRICS & GYNECOLOGY

## 2022-10-12 PROCEDURE — 3008F BODY MASS INDEX DOCD: CPT | Performed by: OBSTETRICS & GYNECOLOGY

## 2022-10-12 PROCEDURE — 3078F DIAST BP <80 MM HG: CPT | Performed by: OBSTETRICS & GYNECOLOGY

## 2022-10-12 PROCEDURE — 99203 OFFICE O/P NEW LOW 30 MIN: CPT | Performed by: OBSTETRICS & GYNECOLOGY

## 2022-10-12 PROCEDURE — 3074F SYST BP LT 130 MM HG: CPT | Performed by: OBSTETRICS & GYNECOLOGY

## 2024-02-20 ENCOUNTER — OFFICE VISIT (OUTPATIENT)
Dept: FAMILY MEDICINE CLINIC | Facility: CLINIC | Age: 29
End: 2024-02-20
Payer: COMMERCIAL

## 2024-02-20 VITALS
OXYGEN SATURATION: 98 % | HEIGHT: 62.5 IN | RESPIRATION RATE: 18 BRPM | WEIGHT: 157 LBS | DIASTOLIC BLOOD PRESSURE: 72 MMHG | HEART RATE: 76 BPM | SYSTOLIC BLOOD PRESSURE: 110 MMHG | BODY MASS INDEX: 28.17 KG/M2

## 2024-02-20 DIAGNOSIS — Z13.0 SCREENING FOR ENDOCRINE, METABOLIC AND IMMUNITY DISORDER: ICD-10-CM

## 2024-02-20 DIAGNOSIS — F17.200 SMOKING: ICD-10-CM

## 2024-02-20 DIAGNOSIS — Z71.84 COUNSELING FOR TRAVEL: ICD-10-CM

## 2024-02-20 DIAGNOSIS — Z00.00 LABORATORY EXAMINATION ORDERED AS PART OF A ROUTINE GENERAL MEDICAL EXAMINATION: ICD-10-CM

## 2024-02-20 DIAGNOSIS — Z13.228 SCREENING FOR ENDOCRINE, METABOLIC AND IMMUNITY DISORDER: ICD-10-CM

## 2024-02-20 DIAGNOSIS — Z00.00 ROUTINE GENERAL MEDICAL EXAMINATION AT A HEALTH CARE FACILITY: Primary | ICD-10-CM

## 2024-02-20 DIAGNOSIS — Z23 NEED FOR VACCINATION: ICD-10-CM

## 2024-02-20 DIAGNOSIS — Z13.89 SCREENING FOR GENITOURINARY CONDITION: ICD-10-CM

## 2024-02-20 DIAGNOSIS — Z13.29 SCREENING FOR ENDOCRINE, METABOLIC AND IMMUNITY DISORDER: ICD-10-CM

## 2024-02-20 DIAGNOSIS — E55.9 VITAMIN D DEFICIENCY: ICD-10-CM

## 2024-02-20 DIAGNOSIS — Z11.59 ENCOUNTER FOR HEPATITIS C SCREENING TEST FOR LOW RISK PATIENT: ICD-10-CM

## 2024-02-20 PROCEDURE — 90715 TDAP VACCINE 7 YRS/> IM: CPT | Performed by: FAMILY MEDICINE

## 2024-02-20 PROCEDURE — 90471 IMMUNIZATION ADMIN: CPT | Performed by: FAMILY MEDICINE

## 2024-02-20 PROCEDURE — 90472 IMMUNIZATION ADMIN EACH ADD: CPT | Performed by: FAMILY MEDICINE

## 2024-02-20 PROCEDURE — 99395 PREV VISIT EST AGE 18-39: CPT | Performed by: FAMILY MEDICINE

## 2024-02-20 PROCEDURE — 99406 BEHAV CHNG SMOKING 3-10 MIN: CPT | Performed by: FAMILY MEDICINE

## 2024-02-20 PROCEDURE — 90734 MENACWYD/MENACWYCRM VACC IM: CPT | Performed by: FAMILY MEDICINE

## 2024-02-20 RX ORDER — ATOVAQUONE AND PROGUANIL HYDROCHLORIDE 250; 100 MG/1; MG/1
1 TABLET, FILM COATED ORAL DAILY
Qty: 200 TABLET | Refills: 0 | Status: SHIPPED | OUTPATIENT
Start: 2024-02-20

## 2024-02-20 NOTE — PATIENT INSTRUCTIONS
Quitting Smoking    Quitting smoking is the most important step you can take to improve your health. We're glad you have set a goal to improve your health.    Quit Smoking Resources    In addition to medications, use the STAR plan to help you successfully quit.   Stick with your quit date!   Tell friends, family, and coworkers your quit date. Request their understanding and support.  Anticipate and prepare for challenges. Some examples are withdrawal symptoms, being around others who smoke, and drinking alcohol.  Remove all tobacco products and paraphernalia from your environment. Make your home and vehicles smoke-free.    Free resources for additional support:  National tobacco quitline: 1-800-QUIT-NOW (1-743.244.4514).  SmokefreeTXT is a free text program to assist you in quitting. Visit https://www.smokefree.gov/smokefreetxt for more information.  Feel free to call your care manager at ([phone]) for additional support.    Getting Support for Quitting Smoking  You don’t have to go through the process of quitting smoking without support. Tell people you are quitting. The support of friends, coworkers, and family members can make a big difference. Face-to-face or telephone counseling can also be helpful, as can a stop-smoking class or an ex-smokers’ group.     Set a quit date  If you’re serious about quitting smoking, choose a specific quit date within the next 2 to 4 weeks. James it in bright, bold letters on a calendar you use often. Tell people about your quit date. Ask for their support. Let your friends, coworkers, and family know how they can help you quit.   Make a contract  A quit-smoking contract gives you a goal. Write out the contract and sign it. Have it witnessed, if you like. Then keep the contract where you’ll see it often, or carry it with you. Read the contract when you’re tempted to smoke.   Take action  On the day you quit, reread your quit contract. Think about the benefits you gain by  quitting, such as better health and an improved sense of taste, as well as the money you will save from not smoking. Also:   Remove cigarettes from your home, car, or any other place where you stash them.  Throw away all smoking materials, including matches, lighters, and ashtrays.  Review your list of triggers and your plan for coping with each of them.  Stay away from people or settings you link with smoking.  Make a quit kit that includes gum, mints, carrot sticks, and things to keep your hands and mouth busy.  Talk to your healthcare provider about using quit-smoking products, such as medicine or a nicotine patch, inhaler, nasal spray, gum, or lozenges.  Ask for help  Sometimes you may just need to talk when you miss smoking. Ex-smokers are good to talk to, because they’re likely to know how you feel. You may need extra support in the first few weeks after you quit. Ask a friend to call you each day to see how you’re doing. Telephone counseling can also help you keep on track. Ask your healthcare provider, local hospital, or public health department to put you in touch with a phone counselor. You may also have to deal with doubters when you decide to quit. Explain to any doubters why you are quitting. Tell them that quitting is important to you. Ask for their support.   Tell your smoking buddies that you can walk together instead of smoking together. If someone thinks you won’t succeed, say that you have a good quit plan and ask for their support. Let them know you’re sticking with it. If they can't give you support, consider limiting contact with them for a while.   Stay positive, and if you slip, start again. Quitting smoking often requires repeated attempts. But smokers do quit for good. It's hard, but with determination and support, you can do it.   To learn more  Get more tips from these resources:  CDC at www.cdc.gov/tobacco/quit_smoking  or 800-QUIT-NOW (510-100-1105)  National [employer] [place] at  www.smokefree.gov  or 877-44U-QUIT (804-251-0073)  American Lung Association at www.lung.org/stop-smoking  or 800-LUNGUSA (426-322-6760)  Walter last reviewed this educational content on 1/1/2022  © 1475-0180 [employer]. All rights reserved. This information is not intended as a substitute for professional medical care. Always follow your healthcare professional's instructions.

## 2024-02-20 NOTE — PROGRESS NOTES
Tobacco Cessation Documentation (Smoking and Smokeless included):  I had an in depth therapy session with Alla Luu about her tobacco use risks and options using the USPSTF's Five A's approach:    Ask: Alla is using tobacco products.  Assess: We asked about/assessed behavioral health risk and factors affecting choice of behavior change goals/methods.  Specifically I asked about readiness to quit tobacco.  Advise: We gave clear, specific, and personalized behavior change advice, including information about personal health harms and benefits. Specifically, she was told that Quitting tobacco is one of the best things she can do for her health. I strongly encouraged her to quit.      Agree: We collaboratively selected appropriate treatment goals and methods based on the patient’s interest in and willingness to change the behavior.   Assist: We used behavior change techniques (self-help and/or counseling), to aid Alla in achieving agreed-upon goals by acquiring the skills, confidence, and social/environmental supports for behavior change, supplemented with adjunctive medical treatments when appropriate. Additionally, national quitting tobacco aides were discussed.   Arrange: Follow up at next visit- see specific follow up below.    Time Counseled: 3 minutes

## 2024-02-20 NOTE — H&P
CC: Annual Physical Exam    HPI:   Alla Luu is a 28 year old female who presents for a complete physical exam. Symptoms: periods are regular. Patient complains of see below.   Would like natural ways to control her cycles.  Found RD that specialized in PCOS.  Discussed insulin resistance and her menstrual cycles improved.  Looking for gyne.  Dr. Walsh Hung on near [address].    Going to [facility] and [facility] in the [facility].  Going for 6 months and leaving for 6 months.  Will do yellow fever at [facility].  Would like malaria drug.  Would like to quit.  Smokes 1/2 pp week.        Wt Readings from Last 6 Encounters:   02/20/24 157 lb (71.2 kg)   10/12/22 166 lb (75.3 kg)   10/02/22 160 lb (72.6 kg)   12/20/21 175 lb (79.4 kg)   06/24/21 181 lb (82.1 kg)   06/07/21 165 lb (74.8 kg)     Body mass index is 28.26 kg/m².     Results for orders placed or performed in visit on 10/12/22   ThinPrep PAP with HPV Reflex Request B   Result Value Ref Range    Thin Prep Pap AP TEST REFLEXED    ThinPrep PAP with HPV Reflex Request   Result Value Ref Range    Interpretation/Result Negative for intraepithelial lesion or malignancy Negative for intraepithelial lesion or malignancy    Specimen Adequacy       Satisfactory for evaluation. Endocervical or metaplastic cells present  Scant cellularity present    General Categorization Negative for intraepithelial lesion or malignancy       Recommendations/Comments       Two separate liquid preparation smears were examined.  Screened by the Thinprep Imaging System and a cytotechnologist.      Embedded Images      Procedure       Monolayers:  2, specimen collected in Thinprep vial, 20 ml Preservcyt      Clinical Information       Z12.4 Cervical Cancer Screening.         Reason for testing Screening     Gyn Additional Information       NOTE:  The Pap smear is a screening test that aids in the detection of cervical cancer and its precursors.  False negative and false positive  results can occur. Regular sampling is recommended to minimize false negative results.      Case Report       Gynecologic Cytology                              Case: [id number]                                  Authorizing Provider:  Kaitlynn Conley DO       Collected:           10/13/2022 04:02 PM          Ordering Location:     [facility]       Received:            10/14/2022 10:47 AM                                 [place]                                                                   First Screen:          [address]                                                               Specimen:    ThinPrep Imager Screening Pap, Cervical/endocervical                                          Current Outpatient Medications   Medication Sig Dispense Refill    Atovaquone-Proguanil HCl (MALARONE) 250-100 MG Oral Tab Take 1 tablet by mouth daily. 200 tablet 0    multivitamin Oral Tab Take 1 tablet by mouth daily. (Patient not taking: Reported on 2/20/2024)      cholecalciferol 25 MCG (1000 UT) Oral Tab Take 1,000 Units by mouth daily. (Patient not taking: Reported on 2/20/2024)        Allergies   Allergen Reactions    Codeine NAUSEA AND VOMITING      Past Medical History:   Diagnosis Date    Anxiety     therapist, no meds    Back pain     hx of lower and upper back pain - undiagnosed    Depression     therapist, no meds    Headache disorder     hx of headaches - blinding    Overweight     Smoker       Past Surgical History:   Procedure Laterality Date    CYST REMOVAL      FOREARM/WRIST SURGERY UNLISTED Left 2002    lwrist excision of a tendon cyst    LAPAROSCOPY,DIAGNOSTIC Bilateral 2009     ovarian cystectomy, BL ovarian cysts    OTHER SURGICAL HISTORY      benign cyst onleft wrist; benign ovarian laproscopy      Family History   Problem Relation Age of Onset    Stroke Paternal Grandfather     Heart Attack Maternal Grandfather     Hypertension Maternal Grandfather     Other (Hemorrhoids) Father      Hypertension Maternal Grandmother     Depression Mother       Social History:   Social History     Socioeconomic History    Marital status: Single   Tobacco Use    Smoking status: Former     Packs/day: .5     Types: Cigarettes     Quit date: 2018     Years since quittin.5    Smokeless tobacco: Former    Tobacco comments:     quit recently   Vaping Use    Vaping Use: Former   Substance and Sexual Activity    Alcohol use: Yes     Alcohol/week: 0.0 standard drinks of alcohol     Comment: 2 drinks//month    Drug use: Not Currently     Types: Cannabis     Comment: once/month    Sexual activity: Yes     Partners: Male     Birth control/protection: Condom   Other Topics Concern    Caffeine Concern Yes     Comment: 1 cup of coffee or tea    Exercise Yes     Comment: daily WTS and cardio     Occ: works at [employer] --  . : S. Children: 0.   Exercise: three times per week.  Diet: watches fats closely and watches sugar closely     REVIEW OF SYSTEMS:   GENERAL: feels well otherwise  SKIN: denies any unusual skin lesions  EYES:denies blurred vision or double vision  HEENT: denies nasal congestion, sinus pain or ST  LUNGS: denies shortness of breath with exertion  CARDIOVASCULAR: denies chest pain on exertion  GI: denies abdominal pain,denies heartburn  : denies dysuria, vaginal discharge or itching, periods regular  MUSCULOSKELETAL: denies back pain  NEURO: denies headaches  PSYCHE: denies depression or anxiety  HEMATOLOGIC: denies hx of anemia  ENDOCRINE: denies thyroid history  ALL/ASTHMA: denies hx of allergy or asthma    EXAM:   /72 (BP Location: Left arm, Patient Position: Sitting, Cuff Size: adult)   Pulse 76   Resp 18   Ht 5' 2.5\" (1.588 m)   Wt 157 lb (71.2 kg)   LMP 2024 (Exact Date)   SpO2 98%   BMI 28.26 kg/m²   Body mass index is 28.26 kg/m².   GENERAL: well developed, well nourished,in no apparent distress  SKIN: no rashes,no suspicious lesions  HEENT: atraumatic,  normocephalic,ears and throat are clear  EYES:PERRLA, EOMI, conjunctiva are clear  NECK: supple,no adenopathy,no bruits, no thyromegaly  CHEST: no chest tenderness  BREAST: DEFERRED  LUNGS: clear to auscultation  CARDIO: RRR without murmur  GI: good BS's,no masses, HSM or tenderness  : DEFERRED  MUSCULOSKELETAL: back is not tender,FROM of the back  EXTREMITIES: no cyanosis, clubbing or edema  NEURO: Oriented times three,cranial nerves are intact,motor and sensory are grossly intact; 2 + knee reflexes bilaterally  VASCULAR: 2 + dorsalis pedal pulses bilaterally    ASSESSMENT AND PLAN:   Alla Luu is a 28 year old female who presents for a complete physical exam.   Pap and pelvic NOT done.   Self breast exam explained.   Health maintenance, will check :   Orders Placed This Encounter   Procedures    CBC W Differential W Platelet [E]    Comp Metabolic Panel (14) [E]    Lipid Panel [E]    TSH [E]    T4 FREE [E] (Free Thyroxine)    Urinalysis, Routine [E]    Hep A AB, Total    Hepatitis B Surface Antibody    HCV Antibody    Measles ([place]) Antibodies, IGG [Ed/[facility]=Qual/ [facility]=Quant]    Mumps Antibodies, IGG [Ed/[facility]=Qual/ [facility]=Quant]    Rubella, IGG [Ed/[facility]=Qual/ [facility]=Quant]    Vitamin D, 25-Hydroxy    TdaP (Adacel, Boostrix) [94820]    Menveo - Meningococcal 2 months -55 years [12655]    Tobacco Use Counseling 3-10 Min [55297]       Last eye exam -- 4  eyes  Last dental exam -- 2/2024  Traveling to [facility] for 6 months.  Labs to [facility] ordered.  Advised typhoid and yellow fever through [employer].  Given TdaP and Menveo today.  Advised to consider chantix to quit smoking.  Consider hypnosis.  Nicotene products did not work for her.  Will find gyne.  Advised to check with insurance for coverage of vitamin D -- pt. states that her insurance covers all labs and it is ok to draw it today.       Pt' s weight is Body mass index is 28.26 kg/m²., recommended low fat diet and aerobic exercise 30 minutes three times weekly.     The patient indicates understanding of these issues and agrees to the plan.  The patient is asked to return in Return in about 1 year (around 2/20/2025) for physical.  .

## 2024-02-26 LAB
ABSOLUTE BASOPHILS: 31 CELLS/UL (ref 0–200)
ABSOLUTE EOSINOPHILS: 70 CELLS/UL (ref 15–500)
ABSOLUTE LYMPHOCYTES: 1069 CELLS/UL (ref 850–3900)
ABSOLUTE MONOCYTES: 390 CELLS/UL (ref 200–950)
ABSOLUTE NEUTROPHILS: 2340 CELLS/UL (ref 1500–7800)
ALBUMIN/GLOBULIN RATIO: 2 (CALC) (ref 1–2.5)
ALBUMIN: 4.5 G/DL (ref 3.6–5.1)
ALKALINE PHOSPHATASE: 51 U/L (ref 31–125)
ALT: 19 U/L (ref 6–29)
APPEARANCE: CLEAR
AST: 16 U/L (ref 10–30)
BASOPHILS: 0.8 %
BILIRUBIN, TOTAL: 0.4 MG/DL (ref 0.2–1.2)
BILIRUBIN: NEGATIVE
BUN: 15 MG/DL (ref 7–25)
CALCIUM: 9.6 MG/DL (ref 8.6–10.2)
CARBON DIOXIDE: 24 MMOL/L (ref 20–32)
CHLORIDE: 108 MMOL/L (ref 98–110)
CHOL/HDLC RATIO: 3.5 (CALC)
CHOLESTEROL, TOTAL: 190 MG/DL
COLOR: YELLOW
CREATININE: 0.9 MG/DL (ref 0.5–0.96)
EGFR: 89 ML/MIN/1.73M2
EOSINOPHILS: 1.8 %
GLOBULIN: 2.2 G/DL (CALC) (ref 1.9–3.7)
GLUCOSE: 85 MG/DL (ref 65–99)
GLUCOSE: NEGATIVE
HDL CHOLESTEROL: 54 MG/DL
HEMATOCRIT: 39.8 % (ref 35–45)
HEMOGLOBIN: 13.5 G/DL (ref 11.7–15.5)
HEPATITIS A AB, TOTAL: REACTIVE
KETONES: NEGATIVE
LDL-CHOLESTEROL: 119 MG/DL (CALC)
LEUKOCYTE ESTERASE: NEGATIVE
LYMPHOCYTES: 27.4 %
MCH: 30.8 PG (ref 27–33)
MCHC: 33.9 G/DL (ref 32–36)
MCV: 90.7 FL (ref 80–100)
MEASLES ANTIBODY (IGG): 279 AU/ML
MONOCYTES: 10 %
MPV: 12.7 FL (ref 7.5–12.5)
MUMPS VIRUS$ANTIBODY (IGG): 25.3 AU/ML
NEUTROPHILS: 60 %
NITRITE: NEGATIVE
NON-HDL CHOLESTEROL: 136 MG/DL (CALC)
OCCULT BLOOD: NEGATIVE
PH: 6 (ref 5–8)
PLATELET COUNT: 164 THOUSAND/UL (ref 140–400)
POTASSIUM: 4.4 MMOL/L (ref 3.5–5.3)
PROTEIN, TOTAL: 6.7 G/DL (ref 6.1–8.1)
PROTEIN: NEGATIVE
RDW: 12.7 % (ref 11–15)
RED BLOOD CELL COUNT: 4.39 MILLION/UL (ref 3.8–5.1)
RUBELLA ANTIBODY (IGG): 3.81 INDEX
SODIUM: 141 MMOL/L (ref 135–146)
SPECIFIC GRAVITY: 1.02 (ref 1–1.03)
T4, FREE: 1 NG/DL (ref 0.8–1.8)
TRIGLYCERIDES: 73 MG/DL
TSH: 1.11 MIU/L
VITAMIN D, 25-OH, TOTAL: 30 NG/ML (ref 30–100)
WHITE BLOOD CELL COUNT: 3.9 THOUSAND/UL (ref 3.8–10.8)

## 2024-05-15 ENCOUNTER — TELEPHONE (OUTPATIENT)
Dept: FAMILY MEDICINE CLINIC | Facility: CLINIC | Age: 29
End: 2024-05-15

## (undated) NOTE — LETTER
Date: 4/26/2022    Patient Name: Iza Parsons          To Whom it may concern: This letter has been written at the patient's request. The above patient was seen at the [facility] for treatment of a medical condition. Alla tested positive for COVID on 4/16/22. Symptoms have resolved and she is passed her 10 quarantine period. She may test positive on further COVID tests for the next 90 days.         Sincerely,        Sheyla Carlin DO